# Patient Record
Sex: MALE | Race: WHITE | NOT HISPANIC OR LATINO | Employment: FULL TIME | URBAN - METROPOLITAN AREA
[De-identification: names, ages, dates, MRNs, and addresses within clinical notes are randomized per-mention and may not be internally consistent; named-entity substitution may affect disease eponyms.]

---

## 2017-01-05 ENCOUNTER — ALLSCRIPTS OFFICE VISIT (OUTPATIENT)
Dept: OTHER | Facility: OTHER | Age: 62
End: 2017-01-05

## 2017-04-25 ENCOUNTER — ALLSCRIPTS OFFICE VISIT (OUTPATIENT)
Dept: OTHER | Facility: OTHER | Age: 62
End: 2017-04-25

## 2017-04-25 DIAGNOSIS — Z13.6 ENCOUNTER FOR SCREENING FOR CARDIOVASCULAR DISORDERS: ICD-10-CM

## 2017-04-25 DIAGNOSIS — Z12.5 ENCOUNTER FOR SCREENING FOR MALIGNANT NEOPLASM OF PROSTATE: ICD-10-CM

## 2017-05-04 ENCOUNTER — ALLSCRIPTS OFFICE VISIT (OUTPATIENT)
Dept: OTHER | Facility: OTHER | Age: 62
End: 2017-05-04

## 2017-05-06 ENCOUNTER — LAB CONVERSION - ENCOUNTER (OUTPATIENT)
Dept: OTHER | Facility: OTHER | Age: 62
End: 2017-05-06

## 2017-05-06 LAB
A/G RATIO (HISTORICAL): 1.5 (CALC) (ref 1–2.5)
ALBUMIN SERPL BCP-MCNC: 4 G/DL (ref 3.6–5.1)
ALP SERPL-CCNC: 107 U/L (ref 40–115)
ALT SERPL W P-5'-P-CCNC: 18 U/L (ref 9–46)
AST SERPL W P-5'-P-CCNC: 19 U/L (ref 10–35)
BILIRUB SERPL-MCNC: 0.6 MG/DL (ref 0.2–1.2)
BUN SERPL-MCNC: 14 MG/DL (ref 7–25)
BUN/CREA RATIO (HISTORICAL): NORMAL (CALC) (ref 6–22)
CALCIUM SERPL-MCNC: 8.9 MG/DL (ref 8.6–10.3)
CHLORIDE SERPL-SCNC: 104 MMOL/L (ref 98–110)
CHOLEST SERPL-MCNC: 193 MG/DL (ref 125–200)
CHOLEST/HDLC SERPL: 6.4 (CALC)
CO2 SERPL-SCNC: 26 MMOL/L (ref 20–31)
CREAT SERPL-MCNC: 1.14 MG/DL (ref 0.7–1.25)
EGFR AFRICAN AMERICAN (HISTORICAL): 79 ML/MIN/1.73M2
EGFR-AMERICAN CALC (HISTORICAL): 69 ML/MIN/1.73M2
GAMMA GLOBULIN (HISTORICAL): 2.7 G/DL (CALC) (ref 1.9–3.7)
GLUCOSE (HISTORICAL): 88 MG/DL (ref 65–99)
HDLC SERPL-MCNC: 30 MG/DL
LDL CHOLESTEROL (HISTORICAL): 139 MG/DL (CALC)
NON-HDL-CHOL (CHOL-HDL) (HISTORICAL): 163 MG/DL (CALC)
POTASSIUM SERPL-SCNC: 4.6 MMOL/L (ref 3.5–5.3)
PROSTATE SPECIFIC ANTIGEN TOTAL (HISTORICAL): 0.8 NG/ML
SODIUM SERPL-SCNC: 138 MMOL/L (ref 135–146)
TOTAL PROTEIN (HISTORICAL): 6.7 G/DL (ref 6.1–8.1)
TRIGL SERPL-MCNC: 118 MG/DL

## 2017-05-07 ENCOUNTER — GENERIC CONVERSION - ENCOUNTER (OUTPATIENT)
Dept: OTHER | Facility: OTHER | Age: 62
End: 2017-05-07

## 2017-05-14 ENCOUNTER — ANESTHESIA EVENT (OUTPATIENT)
Dept: PERIOP | Facility: HOSPITAL | Age: 62
End: 2017-05-14
Payer: COMMERCIAL

## 2017-05-15 ENCOUNTER — ANESTHESIA (OUTPATIENT)
Dept: PERIOP | Facility: HOSPITAL | Age: 62
End: 2017-05-15
Payer: COMMERCIAL

## 2017-05-15 ENCOUNTER — GENERIC CONVERSION - ENCOUNTER (OUTPATIENT)
Dept: OTHER | Facility: OTHER | Age: 62
End: 2017-05-15

## 2017-05-15 ENCOUNTER — HOSPITAL ENCOUNTER (OUTPATIENT)
Facility: HOSPITAL | Age: 62
Setting detail: OUTPATIENT SURGERY
Discharge: HOME | End: 2017-05-15
Attending: INTERNAL MEDICINE | Admitting: INTERNAL MEDICINE
Payer: COMMERCIAL

## 2017-05-15 VITALS
RESPIRATION RATE: 20 BRPM | HEART RATE: 72 BPM | HEIGHT: 68 IN | OXYGEN SATURATION: 97 % | TEMPERATURE: 97.1 F | WEIGHT: 190 LBS | DIASTOLIC BLOOD PRESSURE: 64 MMHG | BODY MASS INDEX: 28.79 KG/M2 | SYSTOLIC BLOOD PRESSURE: 104 MMHG

## 2017-05-15 RX ORDER — SODIUM CHLORIDE, SODIUM LACTATE, POTASSIUM CHLORIDE, CALCIUM CHLORIDE 600; 310; 30; 20 MG/100ML; MG/100ML; MG/100ML; MG/100ML
125 INJECTION, SOLUTION INTRAVENOUS CONTINUOUS
Status: DISCONTINUED | OUTPATIENT
Start: 2017-05-15 | End: 2017-05-15 | Stop reason: HOSPADM

## 2017-05-15 RX ORDER — PROPOFOL 10 MG/ML
INJECTION, EMULSION INTRAVENOUS AS NEEDED
Status: DISCONTINUED | OUTPATIENT
Start: 2017-05-15 | End: 2017-05-15 | Stop reason: SURG

## 2017-05-15 RX ORDER — MAGNESIUM 30 MG
30 TABLET ORAL 2 TIMES DAILY
COMMUNITY
End: 2018-06-19

## 2017-05-15 RX ADMIN — PROPOFOL 20 MG: 10 INJECTION, EMULSION INTRAVENOUS at 10:12

## 2017-05-15 RX ADMIN — SODIUM CHLORIDE, SODIUM LACTATE, POTASSIUM CHLORIDE, AND CALCIUM CHLORIDE 125 ML/HR: .6; .31; .03; .02 INJECTION, SOLUTION INTRAVENOUS at 09:29

## 2017-05-15 RX ADMIN — PROPOFOL 30 MG: 10 INJECTION, EMULSION INTRAVENOUS at 10:15

## 2017-05-15 RX ADMIN — PROPOFOL 20 MG: 10 INJECTION, EMULSION INTRAVENOUS at 10:18

## 2017-05-15 RX ADMIN — PROPOFOL 130 MG: 10 INJECTION, EMULSION INTRAVENOUS at 10:09

## 2017-12-11 ENCOUNTER — GENERIC CONVERSION - ENCOUNTER (OUTPATIENT)
Dept: FAMILY MEDICINE CLINIC | Facility: CLINIC | Age: 62
End: 2017-12-11

## 2018-01-12 NOTE — RESULT NOTES
Verified Results  (1) LIPID PANEL, FASTING [id number] 07:15AM Shaepattyradha [place]agland[place]     Test Name Result Flag Reference   CHOLESTEROL, TOTAL 193 mg/dL  125-200   HDL CHOLESTEROL 30 mg/dL L > OR = 40   TRIGLICERIDES 400 mg/dL  <150   LDL-CHOLESTEROL 139 mg/dL (calc) H <130   Desirable range <100 mg/dL for patients with CHD or  diabetes and <70 mg/dL for diabetic patients with  known heart disease  CHOL/HDLC RATIO 6 4 (calc) H < OR = 5 0   NON HDL CHOLESTEROL 163 mg/dL (calc) H    Target for non-HDL cholesterol is 30 mg/dL higher than   LDL cholesterol target  (1) COMPREHENSIVE METABOLIC PANEL [id number] 61:78UY Shaepattyradha [place]     Test Name Result Flag Reference   GLUCOSE 88 mg/dL  65-99   Fasting reference interval   UREA NITROGEN (BUN) 14 mg/dL  7-25   CREATININE 1 14 mg/dL  0 70-1 25   For patients >52years of age, the reference limit  for Creatinine is approximately 13% higher for people  identified as -American  eGFR NON-AFR  AMERICAN 69 mL/min/1 73m2  > OR = 60   eGFR AFRICAN AMERICAN 79 mL/min/1 73m2  > OR = 60   BUN/CREATININE RATIO   4-27   NOT APPLICABLE (calc)   SODIUM 138 mmol/L  135-146   POTASSIUM 4 6 mmol/L  3 5-5 3   CHLORIDE 104 mmol/L     CARBON DIOXIDE 26 mmol/L  20-31   CALCIUM 8 9 mg/dL  8 6-10 3   PROTEIN, TOTAL 6 7 g/dL  6 1-8 1   ALBUMIN 4 0 g/dL  3 6-5 1   GLOBULIN 2 7 g/dL (calc)  1 9-3 7   ALBUMIN/GLOBULIN RATIO 1 5 (calc)  1 0-2 5   BILIRUBIN, TOTAL 0 6 mg/dL  0 2-1 2   ALKALINE PHOSPHATASE 107 U/L     AST 19 U/L  10-35   ALT 18 U/L  9-46     (1) PSA (SCREEN) (Dx V76 44 Screen for Prostate Cancer) [id number] 07:15AM Jerson Guillermoet   REPORT COMMENT:  FASTING:YES     Test Name Result Flag Reference   PSA, TOTAL 0 8 ng/mL  < OR = 4 0   This test was performed using the Siemens  chemiluminescent method  Values obtained from  different assay methods cannot be used  interchangeably   PSA levels, regardless of  value, should not be interpreted as absolute  evidence of the presence or absence of disease

## 2018-01-13 VITALS
HEIGHT: 68 IN | HEART RATE: 76 BPM | WEIGHT: 195.38 LBS | BODY MASS INDEX: 29.61 KG/M2 | DIASTOLIC BLOOD PRESSURE: 80 MMHG | SYSTOLIC BLOOD PRESSURE: 128 MMHG

## 2018-01-14 VITALS
HEIGHT: 68 IN | HEART RATE: 76 BPM | SYSTOLIC BLOOD PRESSURE: 124 MMHG | WEIGHT: 195 LBS | DIASTOLIC BLOOD PRESSURE: 78 MMHG | BODY MASS INDEX: 29.55 KG/M2

## 2018-01-18 NOTE — PROGRESS NOTES
Assessment   1  Encounter for preventive health examination (V70 0) (Z00 00)    Plan  Health Maintenance    · Follow-up visit in 1 year Evaluation and Treatment  Follow-up  Status: Hold For -  Scheduling  Requested for: 25Apr2017  Prostate cancer screening, Screening for cardiovascular condition    · (1) COMPREHENSIVE METABOLIC PANEL; Status:Active; Requested for:25Apr2017;    · (1) LIPID PANEL, FASTING; Status:Active; Requested for:25Apr2017;    · (1) PSA (SCREEN) (Dx V76 44 Screen for Prostate Cancer); Status:Active; Requested  for:25Apr2017;     Discussion/Summary  Impression: health maintenance visit  Prostate cancer screening: PSA was ordered  Testicular cancer screening: the risks and benefits of testicular cancer screening were discussed  Colorectal cancer screening: colonoscopy is needed every ten years  Screening lab work includes glucose and lipid profile  Advice and education were given regarding weight bearing exercise and cardiovascular risk reduction  Chief Complaint  Patient is here for a yearly check up      History of Present Illness  HM, Adult Male: The patient is being seen for a health maintenance evaluation  General Health: The patient's health since the last visit is described as good  He has regular dental visits  He denies vision problems  He denies hearing loss  Immunizations status: up to date  Lifestyle:  He consumes a diverse and healthy diet  He does not have any weight concerns  He exercises regularly  He does not use tobacco  He denies alcohol use  He denies drug use  Screening: cancer screening reviewed and current  metabolic screening reviewed and current  risk screening reviewed and current  Review of Systems    Constitutional: No fever or chills, feels well, no tiredness, no recent weight gain or weight loss  Eyes: No complaints of eye pain, no red eyes, no discharge from eyes, no itchy eyes     ENT: no complaints of earache, no hearing loss, no nosebleeds, no nasal discharge, no sore throat, no hoarseness  Cardiovascular: No complaints of slow heart rate, no fast heart rate, no chest pain, no palpitations, no leg claudication, no lower extremity  Respiratory: No complaints of shortness of breath, no wheezing, no cough, no SOB on exertion, no orthopnea or PND  Gastrointestinal: No complaints of abdominal pain, no constipation, no nausea or vomiting, no diarrhea or bloody stools  Genitourinary: No complaints of dysuria, no incontinence, no hesitancy, no nocturia, no genital lesion, no testicular pain  Musculoskeletal: No complaints of arthralgia, no myalgias, no joint swelling or stiffness, no limb pain or swelling  Integumentary: No complaints of skin rash or skin lesions, no itching, no skin wound, no dry skin  Neurological: No compliants of headache, no confusion, no convulsions, no numbness or tingling, no dizziness or fainting, no limb weakness, no difficulty walking  Psychiatric: Is not suicidal, no sleep disturbances, no anxiety or depression, no change in personality, no emotional problems  Endocrine: No complaints of proptosis, no hot flashes, no muscle weakness, no erectile dysfunction, no deepening of the voice, no feelings of weakness  Hematologic/Lymphatic: No complaints of swollen glands, no swollen glands in the neck, does not bleed easily, no easy bruising  Active Problems   1  Colon cancer screening (V76 51) (Z12 11)  2  Constipation (564 00) (K59 00)  3  Cutaneous candidiasis (112 3) (B37 2)  4  Encounter for screening colonoscopy (V76 51) (Z12 11)  5  External hemorrhoid (455 3) (K64 4)  6  Prostate cancer screening (V76 44) (Z12 5)  7  Screening for diabetes mellitus (V77 1) (Z13 1)  8  Screening for hyperlipidemia (V77 91) (Z13 220)  9  Tinea of nail (110 1) (B35 1)  10  Varicose veins of legs (454 9) (I83 93)  11   Viral illness (079 99) (B34 9)    Surgical History    · History of Amputation Of Index Finger, With Neurectomy (Each)   · History of Amputation Of Middle Finger, With Neurectomy (Each)   · History of Post Spinal Diskect Osteophytect Lumb Interspace Microdiscec    Family History  Father    · Family history of Cancer (199 1) (C80 1)   · Family history of Heart disease (429 9) (I51 9)   · Family history of Hypertension (401 9) (I10)   · Family history of Prostate cancer (80) (C61)   · Family history of Stroke (434 91) (I63 9)   · Family history of Thyroid trouble (246 9) (E07 9)  Daughter    · Family history of Asthma (493 90) (J45 909)  Paternal Uncle    · Family history of Prostate cancer (80) (C61)    Social History    · Always uses seat belt   · Consumes alcohol occasionally   · Current every day smoker   · Full-time employment   ·    · No drug use    Current Meds  1  Clotrimazole-Betamethasone 1-0 05 % External Cream; APPLY  AND RUB  IN A THIN   FILM TO AFFECTED AREAS TWICE DAILY  (AM AND PM); Therapy: 12Apr2016 to (Last Rx:05Jan2017)  Requested for: [id number] Ordered  2  Hydrocortisone 2 5 % External Cream; APPLY 2-3 TIMES DAILY TO AFFECTED   AREA(S); Therapy: [id number] to (Evaluate:12Apr2017)  Requested for: 05Apr2017; Last   Rx:05Apr2017 Ordered  3  Saw Palmetto 450 MG Oral Capsule; TAKE AS DIRECTED; Therapy: [id number] to (Last Rx:05Jan2017) Ordered    Allergies   1  No Known Drug Allergies    Vitals   Recorded: 25Apr2017 05:48PM   Heart Rate 77   Systolic 844   Diastolic 62   Height 5 ft 8 in   Weight 195 lb 4 96 oz   BMI Calculated 29 7   BSA Calculated 2 03   O2 Saturation 98     Physical Exam    Constitutional   General appearance: No acute distress, well appearing and well nourished  Eyes   Conjunctiva and lids: No erythema, swelling or discharge  Pupils and irises: Equal, round, reactive to light  Ophthalmoscopic examination: Normal fundi and optic discs      Ears, Nose, Mouth, and Throat   External inspection of ears and nose: Normal     Otoscopic examination: Tympanic membranes translucent with normal light reflex  Canals patent without erythema  Hearing: Normal     Nasal mucosa, septum, and turbinates: Normal without edema or erythema  Lips, teeth, and gums: Normal, good dentition  Oropharynx: Normal with no erythema, edema, exudate or lesions  Neck   Neck: Supple, symmetric, trachea midline, no masses  Thyroid: Normal, no thyromegaly  Pulmonary   Respiratory effort: No increased work of breathing or signs of respiratory distress  Percussion of chest: Normal     Palpation of chest: Normal     Auscultation of lungs: Clear to auscultation  Cardiovascular   Palpation of heart: Normal PMI, no thrills  Auscultation of heart: Normal rate and rhythm, normal S1 and S2, no murmurs  Carotid pulses: 2+ bilaterally  Abdominal aorta: Normal     Femoral pulses: 2+ bilaterally  Pedal pulses: 2+ bilaterally  Examination of extremities for edema and/or varicosities: Normal     Chest   Breasts: Normal, no dimpling or skin changes appreciated  Palpation of breasts and axillae: Normal, no masses palpated  Chest: Normal     Abdomen   Abdomen: Non-tender, no masses  Liver and spleen: No hepatomegaly or splenomegaly  Examination for hernias: No hernias appreciated  Lymphatic   Palpation of lymph nodes in neck: No lymphadenopathy  Palpation of lymph nodes in axillae: No lymphadenopathy  Musculoskeletal   Gait and station: Normal     Inspection/palpation of digits and nails: Normal without clubbing or cyanosis  Inspection/palpation of joints, bones, and muscles: Normal     Range of motion: Normal     Stability: Normal     Muscle strength/tone: Normal     Skin   Skin and subcutaneous tissue: Normal without rashes or lesions  Palpation of skin and subcutaneous tissue: Normal turgor  Neurologic   Cranial nerves: Cranial nerves 2-12 intact  Reflexes: 2+ and symmetric  Sensation: No sensory loss      Psychiatric   Judgment and insight: Normal  Orientation to person, place and time: Normal     Recent and remote memory: Intact  Mood and affect: Normal        Future Appointments    Date/Time Provider Specialty Site   05/04/2017 04:15 PM Maritza Mcgovern MD [facility] Adult [facility] [address] [place] [address]   Electronically signed by : Dalia Hernandez DO;  Apr 26 2017 12:15PM EST                       (Author)

## 2018-01-22 VITALS
SYSTOLIC BLOOD PRESSURE: 130 MMHG | HEIGHT: 68 IN | OXYGEN SATURATION: 98 % | DIASTOLIC BLOOD PRESSURE: 62 MMHG | WEIGHT: 195.31 LBS | HEART RATE: 77 BPM | BODY MASS INDEX: 29.6 KG/M2

## 2018-06-18 RX ORDER — NYSTATIN 100000 [USP'U]/G
POWDER TOPICAL
COMMUNITY
Start: 2017-05-15 | End: 2018-06-19

## 2018-06-18 RX ORDER — CLOTRIMAZOLE AND BETAMETHASONE DIPROPIONATE 10; .64 MG/G; MG/G
CREAM TOPICAL 2 TIMES DAILY
COMMUNITY
Start: 2016-04-12 | End: 2018-06-19

## 2018-06-19 ENCOUNTER — OFFICE VISIT (OUTPATIENT)
Dept: FAMILY MEDICINE CLINIC | Facility: CLINIC | Age: 63
End: 2018-06-19
Payer: COMMERCIAL

## 2018-06-19 VITALS
TEMPERATURE: 98.4 F | WEIGHT: 187 LBS | DIASTOLIC BLOOD PRESSURE: 78 MMHG | HEIGHT: 68 IN | BODY MASS INDEX: 28.34 KG/M2 | SYSTOLIC BLOOD PRESSURE: 110 MMHG | OXYGEN SATURATION: 96 % | HEART RATE: 82 BPM

## 2018-06-19 DIAGNOSIS — Z00.00 ENCOUNTER FOR PREVENTIVE HEALTH EXAMINATION: ICD-10-CM

## 2018-06-19 DIAGNOSIS — I83.93 VARICOSE VEINS OF LEGS: Primary | ICD-10-CM

## 2018-06-19 DIAGNOSIS — E78.5 DYSLIPIDEMIA: ICD-10-CM

## 2018-06-19 DIAGNOSIS — R39.11 URINARY HESITANCY: ICD-10-CM

## 2018-06-19 DIAGNOSIS — Z12.5 PROSTATE CANCER SCREENING: ICD-10-CM

## 2018-06-19 PROCEDURE — 99396 PREV VISIT EST AGE 40-64: CPT | Performed by: FAMILY MEDICINE

## 2018-06-19 NOTE — PROGRESS NOTES
Assessment/Plan:    Return for annual physical   Colonoscopy is up-to-date        Problem List Items Addressed This Visit     Encounter for preventive health examination    Dyslipidemia    Relevant Orders    CBC and differential    Comprehensive metabolic panel    Lipid panel    Varicose veins of legs - Primary    Relevant Orders    Ambulatory referral to Vascular Surgery    Urinary hesitancy     Continue saw palmetto           Other Visit Diagnoses     Prostate cancer screening        Relevant Orders    PSA, Total Screen            Subjective:      Patient ID: Sumanth Dueñas is a 61 y o  male  Patient comes in for annual checkup  He complains of varicose vein left leg that has been there for several years but now is becoming painful  The following portions of the patient's history were reviewed and updated as appropriate: allergies, current medications, past family history, past medical history, past social history, past surgical history and problem list     Review of Systems   Constitutional: Negative  HENT: Negative  Respiratory: Negative  Cardiovascular: Negative  Objective:      /78   Pulse 82   Temp 98 4 °F (36 9 °C)   Ht 5' 8" (1 727 m)   Wt 84 8 kg (187 lb)   SpO2 96%   BMI 28 43 kg/m²          Physical Exam   Constitutional: He is oriented to person, place, and time  He appears well-developed and well-nourished  HENT:   Head: Normocephalic and atraumatic  Right Ear: Tympanic membrane and external ear normal    Left Ear: Tympanic membrane and external ear normal    Mouth/Throat: Oropharynx is clear and moist    Eyes: EOM are normal  Pupils are equal, round, and reactive to light  Neck: Neck supple  Cardiovascular: Normal rate, regular rhythm and normal heart sounds  Pulmonary/Chest: Effort normal and breath sounds normal    Abdominal: Soft  Bowel sounds are normal    Musculoskeletal: Normal range of motion     Neurological: He is alert and oriented to person, place, and time  Skin: Skin is warm and dry  Large varicose vein medial left thigh   Psychiatric: He has a normal mood and affect   Thought content normal

## 2018-08-11 LAB
ALBUMIN SERPL-MCNC: 4.1 G/DL (ref 3.6–5.1)
ALBUMIN/GLOB SERPL: 1.6 (CALC) (ref 1–2.5)
ALP SERPL-CCNC: 109 U/L (ref 40–115)
ALT SERPL-CCNC: 21 U/L (ref 9–46)
AST SERPL-CCNC: 20 U/L (ref 10–35)
BASOPHILS # BLD AUTO: 37 CELLS/UL (ref 0–200)
BASOPHILS NFR BLD AUTO: 0.4 %
BILIRUB SERPL-MCNC: 0.7 MG/DL (ref 0.2–1.2)
BUN SERPL-MCNC: 21 MG/DL (ref 7–25)
BUN/CREAT SERPL: NORMAL (CALC) (ref 6–22)
CALCIUM SERPL-MCNC: 9.3 MG/DL (ref 8.6–10.3)
CHLORIDE SERPL-SCNC: 104 MMOL/L (ref 98–110)
CHOLEST SERPL-MCNC: 196 MG/DL
CHOLEST/HDLC SERPL: 6.1 (CALC)
CO2 SERPL-SCNC: 28 MMOL/L (ref 20–32)
CREAT SERPL-MCNC: 1.06 MG/DL (ref 0.7–1.25)
EOSINOPHIL # BLD AUTO: 102 CELLS/UL (ref 15–500)
EOSINOPHIL NFR BLD AUTO: 1.1 %
ERYTHROCYTE [DISTWIDTH] IN BLOOD BY AUTOMATED COUNT: 13.5 % (ref 11–15)
GLOBULIN SER CALC-MCNC: 2.6 G/DL (CALC) (ref 1.9–3.7)
GLUCOSE SERPL-MCNC: 96 MG/DL (ref 65–99)
HCT VFR BLD AUTO: 47.9 % (ref 38.5–50)
HDLC SERPL-MCNC: 32 MG/DL
HGB BLD-MCNC: 16.5 G/DL (ref 13.2–17.1)
LDLC SERPL CALC-MCNC: 139 MG/DL (CALC)
LYMPHOCYTES # BLD AUTO: 2325 CELLS/UL (ref 850–3900)
LYMPHOCYTES NFR BLD AUTO: 25 %
MCH RBC QN AUTO: 31.4 PG (ref 27–33)
MCHC RBC AUTO-ENTMCNC: 34.4 G/DL (ref 32–36)
MCV RBC AUTO: 91.1 FL (ref 80–100)
MONOCYTES # BLD AUTO: 1200 CELLS/UL (ref 200–950)
MONOCYTES NFR BLD AUTO: 12.9 %
NEUTROPHILS # BLD AUTO: 5636 CELLS/UL (ref 1500–7800)
NEUTROPHILS NFR BLD AUTO: 60.6 %
NONHDLC SERPL-MCNC: 164 MG/DL (CALC)
PLATELET # BLD AUTO: 179 THOUSAND/UL (ref 140–400)
PMV BLD REES-ECKER: 10.7 FL (ref 7.5–12.5)
POTASSIUM SERPL-SCNC: 4.3 MMOL/L (ref 3.5–5.3)
PROT SERPL-MCNC: 6.7 G/DL (ref 6.1–8.1)
PSA SERPL-MCNC: 0.7 NG/ML
RBC # BLD AUTO: 5.26 MILLION/UL (ref 4.2–5.8)
SL AMB EGFR AFRICAN AMERICAN: 86 ML/MIN/1.73M2
SL AMB EGFR NON AFRICAN AMERICAN: 74 ML/MIN/1.73M2
SODIUM SERPL-SCNC: 138 MMOL/L (ref 135–146)
TRIGL SERPL-MCNC: 128 MG/DL
WBC # BLD AUTO: 9.3 THOUSAND/UL (ref 3.8–10.8)

## 2018-08-29 ENCOUNTER — TELEPHONE (OUTPATIENT)
Dept: FAMILY MEDICINE CLINIC | Facility: CLINIC | Age: 63
End: 2018-08-29

## 2018-08-29 DIAGNOSIS — E78.5 HYPERLIPIDEMIA, UNSPECIFIED HYPERLIPIDEMIA TYPE: ICD-10-CM

## 2018-08-29 DIAGNOSIS — Z12.5 PROSTATE CANCER SCREENING: ICD-10-CM

## 2018-08-29 DIAGNOSIS — D64.9 ANEMIA, UNSPECIFIED TYPE: Primary | ICD-10-CM

## 2018-08-29 NOTE — TELEPHONE ENCOUNTER
Pt wife called and ask if you can change the DX-code her her recent labs her insurance is not paying for the lab work  Once finish please fax to  [phone]

## 2019-01-15 ENCOUNTER — OFFICE VISIT (OUTPATIENT)
Dept: FAMILY MEDICINE CLINIC | Facility: CLINIC | Age: 64
End: 2019-01-15
Payer: COMMERCIAL

## 2019-01-15 VITALS
RESPIRATION RATE: 16 BRPM | BODY MASS INDEX: 29.55 KG/M2 | TEMPERATURE: 98.4 F | HEIGHT: 68 IN | OXYGEN SATURATION: 98 % | SYSTOLIC BLOOD PRESSURE: 126 MMHG | HEART RATE: 78 BPM | DIASTOLIC BLOOD PRESSURE: 80 MMHG | WEIGHT: 195 LBS

## 2019-01-15 DIAGNOSIS — M54.2 CERVICALGIA: Primary | ICD-10-CM

## 2019-01-15 PROCEDURE — 99213 OFFICE O/P EST LOW 20 MIN: CPT | Performed by: FAMILY MEDICINE

## 2019-01-15 PROCEDURE — 3008F BODY MASS INDEX DOCD: CPT | Performed by: FAMILY MEDICINE

## 2019-01-15 RX ORDER — CYCLOBENZAPRINE HCL 10 MG
10 TABLET ORAL
Qty: 30 TABLET | Refills: 0 | Status: SHIPPED | OUTPATIENT
Start: 2019-01-15 | End: 2019-05-08

## 2019-01-15 RX ORDER — PREDNISONE 10 MG/1
TABLET ORAL
Qty: 30 TABLET | Refills: 0 | Status: SHIPPED | OUTPATIENT
Start: 2019-01-15 | End: 2019-05-08

## 2019-01-15 NOTE — PROGRESS NOTES
Assessment/Plan:           Problem List Items Addressed This Visit     Cervicalgia - Primary    Relevant Medications    predniSONE 10 mg tablet    cyclobenzaprine (FLEXERIL) 10 mg tablet            Subjective:      Patient ID: Patrick Morales is a 61 y o  male  Patient comes in with a 12 day history of left neck pain  This began after he had dental work done on the left side of his jaw  He denies any history of neck problems in the past         The following portions of the patient's history were reviewed and updated as appropriate: allergies, current medications, past family history, past medical history, past social history, past surgical history and problem list     Review of Systems   Constitutional: Negative  HENT: Negative  Respiratory: Negative  Cardiovascular: Negative  Objective:      /80   Pulse 78   Temp 98 4 °F (36 9 °C)   Resp 16   Ht 5' 8" (1 727 m)   Wt 88 5 kg (195 lb)   SpO2 98%   BMI 29 65 kg/m²          Physical Exam   Constitutional: He appears well-developed and well-nourished  HENT:   Head: Normocephalic and atraumatic  Right Ear: External ear normal    Left Ear: External ear normal    Mouth/Throat: Oropharynx is clear and moist    Eyes: Pupils are equal, round, and reactive to light  EOM are normal    Musculoskeletal:   Tender left cervical paraspinal muscles pain with rotation of head to the right and left  Lymphadenopathy:     He has no cervical adenopathy

## 2019-05-08 ENCOUNTER — OFFICE VISIT (OUTPATIENT)
Dept: FAMILY MEDICINE CLINIC | Facility: CLINIC | Age: 64
End: 2019-05-08
Payer: COMMERCIAL

## 2019-05-08 VITALS
HEIGHT: 68 IN | SYSTOLIC BLOOD PRESSURE: 126 MMHG | WEIGHT: 194 LBS | HEART RATE: 78 BPM | RESPIRATION RATE: 16 BRPM | TEMPERATURE: 97.8 F | DIASTOLIC BLOOD PRESSURE: 72 MMHG | BODY MASS INDEX: 29.4 KG/M2 | OXYGEN SATURATION: 98 %

## 2019-05-08 DIAGNOSIS — R68.89 FLU-LIKE SYMPTOMS: Primary | ICD-10-CM

## 2019-05-08 PROCEDURE — 99213 OFFICE O/P EST LOW 20 MIN: CPT | Performed by: FAMILY MEDICINE

## 2019-05-08 PROCEDURE — 87631 RESP VIRUS 3-5 TARGETS: CPT | Performed by: FAMILY MEDICINE

## 2019-05-08 PROCEDURE — 3008F BODY MASS INDEX DOCD: CPT | Performed by: FAMILY MEDICINE

## 2019-05-08 RX ORDER — OSELTAMIVIR PHOSPHATE 75 MG/1
75 CAPSULE ORAL EVERY 12 HOURS SCHEDULED
Qty: 10 CAPSULE | Refills: 0 | Status: SHIPPED | OUTPATIENT
Start: 2019-05-08 | End: 2019-05-14

## 2019-05-08 RX ORDER — PROMETHAZINE HYDROCHLORIDE AND CODEINE PHOSPHATE 6.25; 1 MG/5ML; MG/5ML
5 SYRUP ORAL EVERY 4 HOURS PRN
Qty: 240 ML | Refills: 0 | Status: SHIPPED | OUTPATIENT
Start: 2019-05-08 | End: 2019-05-14

## 2019-05-09 LAB
FLUAV AG SPEC QL: NOT DETECTED
FLUBV AG SPEC QL: NOT DETECTED
RSV B RNA SPEC QL NAA+PROBE: NOT DETECTED

## 2019-05-14 ENCOUNTER — OFFICE VISIT (OUTPATIENT)
Dept: FAMILY MEDICINE CLINIC | Facility: CLINIC | Age: 64
End: 2019-05-14
Payer: COMMERCIAL

## 2019-05-14 VITALS
RESPIRATION RATE: 16 BRPM | HEIGHT: 68 IN | BODY MASS INDEX: 29.1 KG/M2 | HEART RATE: 88 BPM | SYSTOLIC BLOOD PRESSURE: 112 MMHG | TEMPERATURE: 96.8 F | DIASTOLIC BLOOD PRESSURE: 64 MMHG | WEIGHT: 192 LBS | OXYGEN SATURATION: 97 %

## 2019-05-14 DIAGNOSIS — R39.11 URINARY HESITANCY: ICD-10-CM

## 2019-05-14 DIAGNOSIS — Z00.00 ENCOUNTER FOR PREVENTIVE HEALTH EXAMINATION: Primary | ICD-10-CM

## 2019-05-14 DIAGNOSIS — Z12.5 PROSTATE CANCER SCREENING: ICD-10-CM

## 2019-05-14 DIAGNOSIS — E78.5 DYSLIPIDEMIA: ICD-10-CM

## 2019-05-14 PROCEDURE — 99396 PREV VISIT EST AGE 40-64: CPT | Performed by: FAMILY MEDICINE

## 2019-05-14 RX ORDER — TAMSULOSIN HYDROCHLORIDE 0.4 MG/1
0.4 CAPSULE ORAL
Qty: 30 CAPSULE | Refills: 5 | Status: SHIPPED | OUTPATIENT
Start: 2019-05-14 | End: 2019-12-06 | Stop reason: SDUPTHER

## 2019-09-07 LAB
ALBUMIN SERPL-MCNC: 3.9 G/DL (ref 3.6–5.1)
ALBUMIN/GLOB SERPL: 1.8 (CALC) (ref 1–2.5)
ALP SERPL-CCNC: 94 U/L (ref 40–115)
ALT SERPL-CCNC: 17 U/L (ref 9–46)
AST SERPL-CCNC: 18 U/L (ref 10–35)
BASOPHILS # BLD AUTO: 51 CELLS/UL (ref 0–200)
BASOPHILS NFR BLD AUTO: 0.6 %
BILIRUB SERPL-MCNC: 0.5 MG/DL (ref 0.2–1.2)
BUN SERPL-MCNC: 15 MG/DL (ref 7–25)
BUN/CREAT SERPL: NORMAL (CALC) (ref 6–22)
CALCIUM SERPL-MCNC: 9 MG/DL (ref 8.6–10.3)
CHLORIDE SERPL-SCNC: 107 MMOL/L (ref 98–110)
CHOLEST SERPL-MCNC: 185 MG/DL
CHOLEST/HDLC SERPL: 5.3 (CALC)
CO2 SERPL-SCNC: 27 MMOL/L (ref 20–32)
CREAT SERPL-MCNC: 1.19 MG/DL (ref 0.7–1.25)
EOSINOPHIL # BLD AUTO: 162 CELLS/UL (ref 15–500)
EOSINOPHIL NFR BLD AUTO: 1.9 %
ERYTHROCYTE [DISTWIDTH] IN BLOOD BY AUTOMATED COUNT: 13.6 % (ref 11–15)
GLOBULIN SER CALC-MCNC: 2.2 G/DL (CALC) (ref 1.9–3.7)
GLUCOSE SERPL-MCNC: 94 MG/DL (ref 65–99)
HCT VFR BLD AUTO: 46 % (ref 38.5–50)
HDLC SERPL-MCNC: 35 MG/DL
HGB BLD-MCNC: 15.6 G/DL (ref 13.2–17.1)
LDLC SERPL CALC-MCNC: 130 MG/DL (CALC)
LYMPHOCYTES # BLD AUTO: 1964 CELLS/UL (ref 850–3900)
LYMPHOCYTES NFR BLD AUTO: 23.1 %
MCH RBC QN AUTO: 30.6 PG (ref 27–33)
MCHC RBC AUTO-ENTMCNC: 33.9 G/DL (ref 32–36)
MCV RBC AUTO: 90.4 FL (ref 80–100)
MONOCYTES # BLD AUTO: 1054 CELLS/UL (ref 200–950)
MONOCYTES NFR BLD AUTO: 12.4 %
NEUTROPHILS # BLD AUTO: 5270 CELLS/UL (ref 1500–7800)
NEUTROPHILS NFR BLD AUTO: 62 %
NONHDLC SERPL-MCNC: 150 MG/DL (CALC)
PLATELET # BLD AUTO: 161 THOUSAND/UL (ref 140–400)
PMV BLD REES-ECKER: 10.9 FL (ref 7.5–12.5)
POTASSIUM SERPL-SCNC: 4.4 MMOL/L (ref 3.5–5.3)
PROT SERPL-MCNC: 6.1 G/DL (ref 6.1–8.1)
RBC # BLD AUTO: 5.09 MILLION/UL (ref 4.2–5.8)
SL AMB EGFR AFRICAN AMERICAN: 74 ML/MIN/1.73M2
SL AMB EGFR NON AFRICAN AMERICAN: 64 ML/MIN/1.73M2
SODIUM SERPL-SCNC: 139 MMOL/L (ref 135–146)
TRIGL SERPL-MCNC: 96 MG/DL
WBC # BLD AUTO: 8.5 THOUSAND/UL (ref 3.8–10.8)

## 2019-11-11 ENCOUNTER — OFFICE VISIT (OUTPATIENT)
Dept: FAMILY MEDICINE CLINIC | Facility: CLINIC | Age: 64
End: 2019-11-11
Payer: COMMERCIAL

## 2019-11-11 VITALS
TEMPERATURE: 96.5 F | HEART RATE: 77 BPM | SYSTOLIC BLOOD PRESSURE: 106 MMHG | OXYGEN SATURATION: 97 % | BODY MASS INDEX: 29.95 KG/M2 | WEIGHT: 197 LBS | DIASTOLIC BLOOD PRESSURE: 72 MMHG

## 2019-11-11 DIAGNOSIS — K64.8 OTHER HEMORRHOIDS: ICD-10-CM

## 2019-11-11 DIAGNOSIS — L98.9 SKIN LESIONS: Primary | ICD-10-CM

## 2019-11-11 PROCEDURE — 99213 OFFICE O/P EST LOW 20 MIN: CPT | Performed by: PHYSICIAN ASSISTANT

## 2019-11-11 PROCEDURE — 87070 CULTURE OTHR SPECIMN AEROBIC: CPT | Performed by: PHYSICIAN ASSISTANT

## 2019-11-11 PROCEDURE — 87205 SMEAR GRAM STAIN: CPT | Performed by: PHYSICIAN ASSISTANT

## 2019-11-11 RX ORDER — TRIAMCINOLONE ACETONIDE 1 MG/G
CREAM TOPICAL 2 TIMES DAILY
Qty: 30 G | Refills: 0 | Status: SHIPPED | OUTPATIENT
Start: 2019-11-11 | End: 2020-09-10

## 2019-11-11 NOTE — PROGRESS NOTES
Assessment/Plan:          Diagnoses and all orders for this visit:    Skin lesions  -     mupirocin (BACTROBAN) 2 % ointment; Apply topically 3 (three) times a day    Other hemorrhoids  -     triamcinolone (KENALOG) 0 1 % cream; Apply topically 2 (two) times a day        Cultures done    Subjective:      Patient ID: Henrique Logan is a 59 y o  male  Patient has had 2 nonhealing skin lesions on his left lower leg laterally  He says they stay red and get very flaky and then that comes off and it starts all over again  He says they itch if you touch them are bother them  Says they do not hurt and they have not been draining at all  He has now developed another lesion on his right anterior shoulder  He has an area of from his venous surgery that is still healing on his left medial thigh  The following portions of the patient's history were reviewed and updated as appropriate:   He has no past medical history on file  ,  does not have any pertinent problems on file  ,   has a past surgical history that includes Finger amputation; pr colonoscopy flx dx w/collj spec when pfrmd (N/A, 5/15/2017); Spine surgery; and Varicose vein surgery (02/2019)  ,  family history includes Asthma in his daughter; Cancer in his father; Heart disease in his father; Hypertension in his father; Prostate cancer in his father and paternal uncle; Stroke in his father; Thyroid disease in his father  ,   reports that he has been smoking  He has been smoking about 0 20 packs per day  He has never used smokeless tobacco  He reports that he drinks alcohol  He reports that he does not use drugs  ,  has No Known Allergies     Current Outpatient Medications   Medication Sig Dispense Refill    tamsulosin (FLOMAX) 0 4 mg Take 1 capsule (0 4 mg total) by mouth daily with dinner 30 capsule 5    mupirocin (BACTROBAN) 2 % ointment Apply topically 3 (three) times a day 22 g 0    Saw Palmetto, Serenoa repens, 450 MG CAPS Take by mouth      triamcinolone (KENALOG) 0 1 % cream Apply topically 2 (two) times a day 30 g 0     No current facility-administered medications for this visit  Review of Systems   Constitutional: Negative for activity change, appetite change, fatigue and fever  HENT: Negative for trouble swallowing  Respiratory: Negative for shortness of breath  Cardiovascular: Negative for chest pain and leg swelling  Gastrointestinal: Negative for abdominal pain  Endocrine: Negative for cold intolerance and heat intolerance  Genitourinary: Negative for difficulty urinating  Skin: Positive for wound  Neurological: Negative for dizziness, light-headedness and headaches  Psychiatric/Behavioral: Negative for self-injury, sleep disturbance and suicidal ideas  Objective:  Vitals:    11/11/19 1646   BP: 106/72   Pulse: 77   Temp: (!) 96 5 °F (35 8 °C)   SpO2: 97%   Weight: 89 4 kg (197 lb)     Body mass index is 29 95 kg/m²  Physical Exam   Constitutional: He is oriented to person, place, and time  He appears well-developed and well-nourished  HENT:   Head: Normocephalic  Cardiovascular: Normal rate and regular rhythm  Pulmonary/Chest: Effort normal    Musculoskeletal: He exhibits no edema  Neurological: He is alert and oriented to person, place, and time  Skin: Skin is warm and dry  Areas of nonhealing lesions   Psychiatric: He has a normal mood and affect  His behavior is normal    Nursing note and vitals reviewed

## 2019-11-13 LAB
BACTERIA WND AEROBE CULT: NORMAL
GRAM STN SPEC: NORMAL

## 2019-11-14 DIAGNOSIS — L98.9 SKIN LESIONS: Primary | ICD-10-CM

## 2019-11-14 LAB
BACTERIA WND AEROBE CULT: ABNORMAL
GRAM STN SPEC: ABNORMAL

## 2019-11-14 RX ORDER — SULFAMETHOXAZOLE AND TRIMETHOPRIM 800; 160 MG/1; MG/1
1 TABLET ORAL EVERY 12 HOURS SCHEDULED
Qty: 20 TABLET | Refills: 0 | Status: SHIPPED | OUTPATIENT
Start: 2019-11-14 | End: 2019-11-24

## 2019-12-06 DIAGNOSIS — R39.11 URINARY HESITANCY: ICD-10-CM

## 2019-12-06 RX ORDER — TAMSULOSIN HYDROCHLORIDE 0.4 MG/1
0.4 CAPSULE ORAL
Qty: 30 CAPSULE | Refills: 5 | Status: SHIPPED | OUTPATIENT
Start: 2019-12-06 | End: 2020-01-22 | Stop reason: ALTCHOICE

## 2020-01-22 ENCOUNTER — OFFICE VISIT (OUTPATIENT)
Dept: FAMILY MEDICINE CLINIC | Facility: CLINIC | Age: 65
End: 2020-01-22
Payer: COMMERCIAL

## 2020-01-22 VITALS
DIASTOLIC BLOOD PRESSURE: 72 MMHG | HEIGHT: 68 IN | HEART RATE: 88 BPM | SYSTOLIC BLOOD PRESSURE: 120 MMHG | TEMPERATURE: 97.1 F | WEIGHT: 198 LBS | BODY MASS INDEX: 30.01 KG/M2 | OXYGEN SATURATION: 94 %

## 2020-01-22 DIAGNOSIS — L98.9 SKIN LESIONS: Primary | ICD-10-CM

## 2020-01-22 DIAGNOSIS — R39.11 URINARY HESITANCY: ICD-10-CM

## 2020-01-22 PROCEDURE — 99213 OFFICE O/P EST LOW 20 MIN: CPT | Performed by: PHYSICIAN ASSISTANT

## 2020-01-22 RX ORDER — CLOTRIMAZOLE AND BETAMETHASONE DIPROPIONATE 10; .64 MG/G; MG/G
CREAM TOPICAL 2 TIMES DAILY
Qty: 30 G | Refills: 0 | Status: SHIPPED | OUTPATIENT
Start: 2020-01-22

## 2020-01-22 RX ORDER — DOXYCYCLINE 100 MG/1
100 CAPSULE ORAL 2 TIMES DAILY
Qty: 20 CAPSULE | Refills: 0 | Status: SHIPPED | OUTPATIENT
Start: 2020-01-22 | End: 2020-02-01

## 2020-01-22 RX ORDER — TAMSULOSIN HYDROCHLORIDE 0.4 MG/1
0.8 CAPSULE ORAL
Qty: 180 CAPSULE | Refills: 1 | Status: SHIPPED | OUTPATIENT
Start: 2020-01-22 | End: 2020-06-22

## 2020-01-22 NOTE — PROGRESS NOTES
Assessment/Plan:  BMI Counseling: Body mass index is 30 11 kg/m²  The BMI is above normal  Nutrition recommendations include decreasing portion sizes, consuming healthier snacks, limiting drinks that contain sugar, moderation in carbohydrate intake, increasing intake of lean protein, reducing intake of saturated and trans fat and reducing intake of cholesterol  Exercise recommendations include exercising 3-5 times per week and strength training exercises  No pharmacotherapy was ordered  Diagnoses and all orders for this visit:    Skin lesions  -     doxycycline monohydrate (MONODOX) 100 mg capsule; Take 1 capsule (100 mg total) by mouth 2 (two) times a day for 10 days  -     clotrimazole-betamethasone (LOTRISONE) 1-0 05 % cream; Apply topically 2 (two) times a day    Urinary hesitancy  -     tamsulosin (FLOMAX) 0 4 mg; Take 2 capsules (0 8 mg total) by mouth daily with dinner            Subjective:      Patient ID: Vanita Osler is a 59 y o  male  Patient is here complaining that the 3 skin lesions that he has had for a while still are not healing completely  He states they will scab over and then the scab comes off and they will have some slight drainage and be raw and then scab over again  At times a become itchy and he will scratch the scab off  He has treated them with mupirocin cream and steroid cream       The following portions of the patient's history were reviewed and updated as appropriate:   He has no past medical history on file  ,  does not have any pertinent problems on file  ,   has a past surgical history that includes Finger amputation; pr colonoscopy flx dx w/collj spec when pfrmd (N/A, 5/15/2017); Spine surgery; and Varicose vein surgery (02/2019)  ,  family history includes Asthma in his daughter; Cancer in his father; Heart disease in his father; Hypertension in his father; Prostate cancer in his father and paternal uncle; Stroke in his father; Thyroid disease in his father  , reports that he has been smoking  He has been smoking about 0 20 packs per day  He has never used smokeless tobacco  He reports that he drinks alcohol  He reports that he does not use drugs  ,  has No Known Allergies     Current Outpatient Medications   Medication Sig Dispense Refill    clotrimazole-betamethasone (LOTRISONE) 1-0 05 % cream Apply topically 2 (two) times a day 30 g 0    doxycycline monohydrate (MONODOX) 100 mg capsule Take 1 capsule (100 mg total) by mouth 2 (two) times a day for 10 days 20 capsule 0    mupirocin (BACTROBAN) 2 % ointment Apply topically 3 (three) times a day 22 g 0    Saw Palmetto, Serenoa repens, 450 MG CAPS Take by mouth      tamsulosin (FLOMAX) 0 4 mg Take 2 capsules (0 8 mg total) by mouth daily with dinner 180 capsule 1    triamcinolone (KENALOG) 0 1 % cream Apply topically 2 (two) times a day 30 g 0     No current facility-administered medications for this visit  Review of Systems   Constitutional: Negative for activity change, appetite change, chills and fever  HENT: Negative for congestion, mouth sores, sore throat and trouble swallowing  Respiratory: Negative for shortness of breath  Gastrointestinal: Negative for abdominal pain and nausea  Genitourinary: Positive for difficulty urinating  Musculoskeletal: Negative for arthralgias and myalgias  Skin: Positive for wound  Neurological: Negative for dizziness, light-headedness and headaches  Objective:  Vitals:    01/22/20 0807   BP: 120/72   Pulse: 88   Temp: (!) 97 1 °F (36 2 °C)   SpO2: 94%   Weight: 89 8 kg (198 lb)   Height: 5' 8" (1 727 m)     Body mass index is 30 11 kg/m²  Physical Exam   Constitutional: He is oriented to person, place, and time  He appears well-developed and well-nourished  HENT:   Head: Normocephalic  Eyes: Conjunctivae are normal    Cardiovascular: Normal rate and regular rhythm  Pulmonary/Chest: Effort normal    Musculoskeletal: He exhibits no edema  Neurological: He is alert and oriented to person, place, and time  Skin: Skin is warm and dry  Lesion noted  There is erythema (no drainage)  Psychiatric: He has a normal mood and affect  His behavior is normal    Nursing note and vitals reviewed

## 2020-06-21 DIAGNOSIS — R39.11 URINARY HESITANCY: ICD-10-CM

## 2020-06-22 RX ORDER — TAMSULOSIN HYDROCHLORIDE 0.4 MG/1
0.8 CAPSULE ORAL
Qty: 180 CAPSULE | Refills: 1 | Status: SHIPPED | OUTPATIENT
Start: 2020-06-22 | End: 2020-09-10 | Stop reason: SDUPTHER

## 2020-09-10 ENCOUNTER — OFFICE VISIT (OUTPATIENT)
Dept: FAMILY MEDICINE CLINIC | Facility: CLINIC | Age: 65
End: 2020-09-10
Payer: COMMERCIAL

## 2020-09-10 VITALS
TEMPERATURE: 96.3 F | BODY MASS INDEX: 29.86 KG/M2 | HEART RATE: 91 BPM | DIASTOLIC BLOOD PRESSURE: 70 MMHG | HEIGHT: 68 IN | WEIGHT: 197 LBS | OXYGEN SATURATION: 96 % | SYSTOLIC BLOOD PRESSURE: 118 MMHG

## 2020-09-10 DIAGNOSIS — N40.1 BPH WITH OBSTRUCTION/LOWER URINARY TRACT SYMPTOMS: Primary | ICD-10-CM

## 2020-09-10 DIAGNOSIS — Z12.5 PROSTATE CANCER SCREENING: ICD-10-CM

## 2020-09-10 DIAGNOSIS — N13.8 BPH WITH OBSTRUCTION/LOWER URINARY TRACT SYMPTOMS: Primary | ICD-10-CM

## 2020-09-10 DIAGNOSIS — E78.5 DYSLIPIDEMIA: ICD-10-CM

## 2020-09-10 DIAGNOSIS — Z00.00 ENCOUNTER FOR PREVENTIVE HEALTH EXAMINATION: ICD-10-CM

## 2020-09-10 PROBLEM — R39.11 URINARY HESITANCY: Status: RESOLVED | Noted: 2018-06-19 | Resolved: 2020-09-10

## 2020-09-10 PROCEDURE — 3725F SCREEN DEPRESSION PERFORMED: CPT | Performed by: FAMILY MEDICINE

## 2020-09-10 PROCEDURE — 1101F PT FALLS ASSESS-DOCD LE1/YR: CPT | Performed by: FAMILY MEDICINE

## 2020-09-10 PROCEDURE — 1036F TOBACCO NON-USER: CPT | Performed by: FAMILY MEDICINE

## 2020-09-10 PROCEDURE — 99397 PER PM REEVAL EST PAT 65+ YR: CPT | Performed by: FAMILY MEDICINE

## 2020-09-10 PROCEDURE — 3288F FALL RISK ASSESSMENT DOCD: CPT | Performed by: FAMILY MEDICINE

## 2020-09-10 RX ORDER — TAMSULOSIN HYDROCHLORIDE 0.4 MG/1
0.8 CAPSULE ORAL
Qty: 180 CAPSULE | Refills: 3 | Status: SHIPPED | OUTPATIENT
Start: 2020-09-10 | End: 2021-11-08

## 2020-09-10 RX ORDER — FINASTERIDE 5 MG/1
5 TABLET, FILM COATED ORAL DAILY
Qty: 90 TABLET | Refills: 3 | Status: SHIPPED | OUTPATIENT
Start: 2020-09-10 | End: 2021-10-21 | Stop reason: SDUPTHER

## 2020-09-10 RX ORDER — TAMSULOSIN HYDROCHLORIDE 0.4 MG/1
0.8 CAPSULE ORAL
Qty: 180 CAPSULE | Refills: 1 | Status: CANCELLED | OUTPATIENT
Start: 2020-09-10

## 2020-09-10 NOTE — PROGRESS NOTES
Assessment/Plan:    Return visit in 1 year time for annual physical   We will call regarding results of his blood tests     Problem List Items Addressed This Visit        Genitourinary    BPH with obstruction/lower urinary tract symptoms - Primary    Relevant Medications    tamsulosin (FLOMAX) 0 4 mg    finasteride (PROSCAR) 5 mg tablet       Other    Encounter for preventive health examination    Dyslipidemia    Relevant Orders    CBC and differential    Comprehensive metabolic panel    Lipid panel      Other Visit Diagnoses     Prostate cancer screening        Relevant Orders    PSA, Total Screen            Subjective:      Patient ID: Nathan Dhaliwal is a 72 y o  male  Patient comes in for checkup  He quit smoking 7 months ago  He complains of urinary hesitation despite taking maximum dose of Flomax  The following portions of the patient's history were reviewed and updated as appropriate:   He has no past medical history on file  ,  does not have any pertinent problems on file  ,   has a past surgical history that includes Finger amputation; pr colonoscopy flx dx w/collj spec when pfrmd (N/A, 5/15/2017); Spine surgery; and Varicose vein surgery (02/2019)  ,  family history includes Asthma in his daughter; Cancer in his father; Heart disease in his father; Hypertension in his father; Prostate cancer in his father and paternal uncle; Stroke in his father; Thyroid disease in his father  ,   reports that he quit smoking about 7 months ago  He smoked 0 20 packs per day  He has never used smokeless tobacco  He reports current alcohol use  He reports that he does not use drugs  ,  has No Known Allergies     Current Outpatient Medications   Medication Sig Dispense Refill    clotrimazole-betamethasone (LOTRISONE) 1-0 05 % cream Apply topically 2 (two) times a day 30 g 0    tamsulosin (FLOMAX) 0 4 mg Take 2 capsules (0 8 mg total) by mouth daily with dinner 180 capsule 3    finasteride (PROSCAR) 5 mg tablet Take 1 tablet (5 mg total) by mouth daily 90 tablet 3    Saw Palmetto, Serenoa repens, 450 MG CAPS Take by mouth       No current facility-administered medications for this visit  Review of Systems   Constitutional: Negative  Respiratory: Negative  Cardiovascular: Negative  Gastrointestinal: Negative  Genitourinary: Positive for difficulty urinating  Objective:  Vitals:    09/10/20 0847   BP: 118/70   BP Location: Left arm   Patient Position: Sitting   Pulse: 91   Temp: (!) 96 3 °F (35 7 °C)   TempSrc: Tympanic   SpO2: 96%   Weight: 89 4 kg (197 lb)   Height: 5' 8" (1 727 m)     Body mass index is 29 95 kg/m²  Physical Exam  Constitutional:       Appearance: Normal appearance  He is well-developed  HENT:      Head: Normocephalic and atraumatic  Right Ear: Tympanic membrane, ear canal and external ear normal       Left Ear: Tympanic membrane, ear canal and external ear normal       Mouth/Throat:      Mouth: Mucous membranes are moist       Pharynx: Oropharynx is clear  Eyes:      Pupils: Pupils are equal, round, and reactive to light  Neck:      Musculoskeletal: Neck supple  Cardiovascular:      Rate and Rhythm: Normal rate and regular rhythm  Heart sounds: Normal heart sounds  Pulmonary:      Effort: Pulmonary effort is normal       Breath sounds: Normal breath sounds  Abdominal:      General: Bowel sounds are normal       Palpations: Abdomen is soft  Musculoskeletal: Normal range of motion  Skin:     General: Skin is warm and dry  Neurological:      Mental Status: He is alert and oriented to person, place, and time  Psychiatric:         Thought Content:  Thought content normal

## 2020-09-20 LAB
ALBUMIN SERPL-MCNC: 4.3 G/DL (ref 3.6–5.1)
ALBUMIN/GLOB SERPL: 1.9 (CALC) (ref 1–2.5)
ALP SERPL-CCNC: 91 U/L (ref 35–144)
ALT SERPL-CCNC: 28 U/L (ref 9–46)
AST SERPL-CCNC: 23 U/L (ref 10–35)
BASOPHILS # BLD AUTO: 27 CELLS/UL (ref 0–200)
BASOPHILS NFR BLD AUTO: 0.3 %
BILIRUB SERPL-MCNC: 0.7 MG/DL (ref 0.2–1.2)
BUN SERPL-MCNC: 13 MG/DL (ref 7–25)
BUN/CREAT SERPL: ABNORMAL (CALC) (ref 6–22)
CALCIUM SERPL-MCNC: 9.6 MG/DL (ref 8.6–10.3)
CHLORIDE SERPL-SCNC: 105 MMOL/L (ref 98–110)
CHOLEST SERPL-MCNC: 187 MG/DL
CHOLEST/HDLC SERPL: 5.8 (CALC)
CO2 SERPL-SCNC: 28 MMOL/L (ref 20–32)
CREAT SERPL-MCNC: 1.01 MG/DL (ref 0.7–1.25)
EOSINOPHIL # BLD AUTO: 98 CELLS/UL (ref 15–500)
EOSINOPHIL NFR BLD AUTO: 1.1 %
ERYTHROCYTE [DISTWIDTH] IN BLOOD BY AUTOMATED COUNT: 13.4 % (ref 11–15)
GLOBULIN SER CALC-MCNC: 2.3 G/DL (CALC) (ref 1.9–3.7)
GLUCOSE SERPL-MCNC: 102 MG/DL (ref 65–99)
HCT VFR BLD AUTO: 47.3 % (ref 38.5–50)
HDLC SERPL-MCNC: 32 MG/DL
HGB BLD-MCNC: 16.1 G/DL (ref 13.2–17.1)
LDLC SERPL CALC-MCNC: 133 MG/DL (CALC)
LYMPHOCYTES # BLD AUTO: 2216 CELLS/UL (ref 850–3900)
LYMPHOCYTES NFR BLD AUTO: 24.9 %
MCH RBC QN AUTO: 30.8 PG (ref 27–33)
MCHC RBC AUTO-ENTMCNC: 34 G/DL (ref 32–36)
MCV RBC AUTO: 90.4 FL (ref 80–100)
MONOCYTES # BLD AUTO: 1237 CELLS/UL (ref 200–950)
MONOCYTES NFR BLD AUTO: 13.9 %
NEUTROPHILS # BLD AUTO: 5322 CELLS/UL (ref 1500–7800)
NEUTROPHILS NFR BLD AUTO: 59.8 %
NONHDLC SERPL-MCNC: 155 MG/DL (CALC)
PLATELET # BLD AUTO: 159 THOUSAND/UL (ref 140–400)
PMV BLD REES-ECKER: 11 FL (ref 7.5–12.5)
POTASSIUM SERPL-SCNC: 4.4 MMOL/L (ref 3.5–5.3)
PROT SERPL-MCNC: 6.6 G/DL (ref 6.1–8.1)
PSA SERPL-MCNC: 0.7 NG/ML
RBC # BLD AUTO: 5.23 MILLION/UL (ref 4.2–5.8)
SL AMB EGFR AFRICAN AMERICAN: 90 ML/MIN/1.73M2
SL AMB EGFR NON AFRICAN AMERICAN: 78 ML/MIN/1.73M2
SODIUM SERPL-SCNC: 140 MMOL/L (ref 135–146)
TRIGL SERPL-MCNC: 113 MG/DL
WBC # BLD AUTO: 8.9 THOUSAND/UL (ref 3.8–10.8)

## 2021-10-19 ENCOUNTER — TELEPHONE (OUTPATIENT)
Dept: FAMILY MEDICINE CLINIC | Facility: CLINIC | Age: 66
End: 2021-10-19

## 2021-10-19 DIAGNOSIS — Z12.5 PROSTATE CANCER SCREENING: ICD-10-CM

## 2021-10-19 DIAGNOSIS — E78.5 DYSLIPIDEMIA: Primary | ICD-10-CM

## 2021-10-19 PROBLEM — L98.9 SKIN LESIONS: Status: RESOLVED | Noted: 2019-11-11 | Resolved: 2021-10-19

## 2021-10-21 DIAGNOSIS — N13.8 BPH WITH OBSTRUCTION/LOWER URINARY TRACT SYMPTOMS: ICD-10-CM

## 2021-10-21 DIAGNOSIS — N40.1 BPH WITH OBSTRUCTION/LOWER URINARY TRACT SYMPTOMS: ICD-10-CM

## 2021-10-21 RX ORDER — FINASTERIDE 5 MG/1
5 TABLET, FILM COATED ORAL DAILY
Qty: 30 TABLET | Refills: 0 | Status: SHIPPED | OUTPATIENT
Start: 2021-10-21 | End: 2021-11-08 | Stop reason: SDUPTHER

## 2021-11-04 LAB
ALBUMIN SERPL-MCNC: 4.2 G/DL (ref 3.6–5.1)
ALBUMIN/GLOB SERPL: 1.8 (CALC) (ref 1–2.5)
ALP SERPL-CCNC: 95 U/L (ref 35–144)
ALT SERPL-CCNC: 21 U/L (ref 9–46)
AST SERPL-CCNC: 20 U/L (ref 10–35)
BASOPHILS # BLD AUTO: 49 CELLS/UL (ref 0–200)
BASOPHILS NFR BLD AUTO: 0.5 %
BILIRUB SERPL-MCNC: 0.4 MG/DL (ref 0.2–1.2)
BUN SERPL-MCNC: 14 MG/DL (ref 7–25)
BUN/CREAT SERPL: NORMAL (CALC) (ref 6–22)
CALCIUM SERPL-MCNC: 9.3 MG/DL (ref 8.6–10.3)
CHLORIDE SERPL-SCNC: 104 MMOL/L (ref 98–110)
CHOLEST SERPL-MCNC: 171 MG/DL
CHOLEST/HDLC SERPL: 5.2 (CALC)
CO2 SERPL-SCNC: 28 MMOL/L (ref 20–32)
CREAT SERPL-MCNC: 0.99 MG/DL (ref 0.7–1.25)
EOSINOPHIL # BLD AUTO: 107 CELLS/UL (ref 15–500)
EOSINOPHIL NFR BLD AUTO: 1.1 %
ERYTHROCYTE [DISTWIDTH] IN BLOOD BY AUTOMATED COUNT: 12.5 % (ref 11–15)
GLOBULIN SER CALC-MCNC: 2.3 G/DL (CALC) (ref 1.9–3.7)
GLUCOSE SERPL-MCNC: 97 MG/DL (ref 65–99)
HCT VFR BLD AUTO: 45.7 % (ref 38.5–50)
HDLC SERPL-MCNC: 33 MG/DL
HGB BLD-MCNC: 15.7 G/DL (ref 13.2–17.1)
LDLC SERPL CALC-MCNC: 116 MG/DL (CALC)
LYMPHOCYTES # BLD AUTO: 2367 CELLS/UL (ref 850–3900)
LYMPHOCYTES NFR BLD AUTO: 24.4 %
MCH RBC QN AUTO: 30.9 PG (ref 27–33)
MCHC RBC AUTO-ENTMCNC: 34.4 G/DL (ref 32–36)
MCV RBC AUTO: 90 FL (ref 80–100)
MONOCYTES # BLD AUTO: 1426 CELLS/UL (ref 200–950)
MONOCYTES NFR BLD AUTO: 14.7 %
NEUTROPHILS # BLD AUTO: 5752 CELLS/UL (ref 1500–7800)
NEUTROPHILS NFR BLD AUTO: 59.3 %
NONHDLC SERPL-MCNC: 138 MG/DL (CALC)
PLATELET # BLD AUTO: 147 THOUSAND/UL (ref 140–400)
PMV BLD REES-ECKER: 11.2 FL (ref 7.5–12.5)
POTASSIUM SERPL-SCNC: 4.5 MMOL/L (ref 3.5–5.3)
PROT SERPL-MCNC: 6.5 G/DL (ref 6.1–8.1)
PSA SERPL-MCNC: 0.41 NG/ML
RBC # BLD AUTO: 5.08 MILLION/UL (ref 4.2–5.8)
SL AMB EGFR AFRICAN AMERICAN: 92 ML/MIN/1.73M2
SL AMB EGFR NON AFRICAN AMERICAN: 79 ML/MIN/1.73M2
SODIUM SERPL-SCNC: 139 MMOL/L (ref 135–146)
TRIGL SERPL-MCNC: 113 MG/DL
WBC # BLD AUTO: 9.7 THOUSAND/UL (ref 3.8–10.8)

## 2021-11-08 ENCOUNTER — OFFICE VISIT (OUTPATIENT)
Dept: FAMILY MEDICINE CLINIC | Facility: CLINIC | Age: 66
End: 2021-11-08
Payer: COMMERCIAL

## 2021-11-08 VITALS
HEIGHT: 68 IN | DIASTOLIC BLOOD PRESSURE: 84 MMHG | SYSTOLIC BLOOD PRESSURE: 124 MMHG | HEART RATE: 95 BPM | OXYGEN SATURATION: 95 % | BODY MASS INDEX: 31.43 KG/M2 | WEIGHT: 207.4 LBS

## 2021-11-08 DIAGNOSIS — N13.8 BPH WITH OBSTRUCTION/LOWER URINARY TRACT SYMPTOMS: ICD-10-CM

## 2021-11-08 DIAGNOSIS — N52.9 ERECTILE DYSFUNCTION, UNSPECIFIED ERECTILE DYSFUNCTION TYPE: ICD-10-CM

## 2021-11-08 DIAGNOSIS — N40.1 BPH WITH OBSTRUCTION/LOWER URINARY TRACT SYMPTOMS: ICD-10-CM

## 2021-11-08 DIAGNOSIS — Z00.00 HEALTH MAINTENANCE EXAMINATION: Primary | ICD-10-CM

## 2021-11-08 DIAGNOSIS — Z12.5 PROSTATE CANCER SCREENING: ICD-10-CM

## 2021-11-08 DIAGNOSIS — L40.9 PSORIASIS: ICD-10-CM

## 2021-11-08 DIAGNOSIS — G47.00 INSOMNIA, UNSPECIFIED TYPE: ICD-10-CM

## 2021-11-08 DIAGNOSIS — E78.5 DYSLIPIDEMIA: ICD-10-CM

## 2021-11-08 PROCEDURE — 4040F PNEUMOC VAC/ADMIN/RCVD: CPT | Performed by: FAMILY MEDICINE

## 2021-11-08 PROCEDURE — 1036F TOBACCO NON-USER: CPT | Performed by: FAMILY MEDICINE

## 2021-11-08 PROCEDURE — 1101F PT FALLS ASSESS-DOCD LE1/YR: CPT | Performed by: FAMILY MEDICINE

## 2021-11-08 PROCEDURE — 90732 PPSV23 VACC 2 YRS+ SUBQ/IM: CPT | Performed by: FAMILY MEDICINE

## 2021-11-08 PROCEDURE — 99397 PER PM REEVAL EST PAT 65+ YR: CPT | Performed by: FAMILY MEDICINE

## 2021-11-08 PROCEDURE — 90471 IMMUNIZATION ADMIN: CPT | Performed by: FAMILY MEDICINE

## 2021-11-08 PROCEDURE — 3725F SCREEN DEPRESSION PERFORMED: CPT | Performed by: FAMILY MEDICINE

## 2021-11-08 PROCEDURE — 3288F FALL RISK ASSESSMENT DOCD: CPT | Performed by: FAMILY MEDICINE

## 2021-11-08 PROCEDURE — 3008F BODY MASS INDEX DOCD: CPT | Performed by: FAMILY MEDICINE

## 2021-11-08 PROCEDURE — 1160F RVW MEDS BY RX/DR IN RCRD: CPT | Performed by: FAMILY MEDICINE

## 2021-11-08 RX ORDER — TAMSULOSIN HYDROCHLORIDE 0.4 MG/1
CAPSULE ORAL
Qty: 180 CAPSULE | Refills: 3 | Status: SHIPPED | OUTPATIENT
Start: 2021-11-08

## 2021-11-08 RX ORDER — SILDENAFIL 50 MG/1
50 TABLET, FILM COATED ORAL DAILY PRN
Qty: 10 TABLET | Refills: 10 | Status: SHIPPED | OUTPATIENT
Start: 2021-11-08

## 2021-11-08 RX ORDER — FINASTERIDE 5 MG/1
5 TABLET, FILM COATED ORAL DAILY
Qty: 90 TABLET | Refills: 3 | Status: SHIPPED | OUTPATIENT
Start: 2021-11-08

## 2021-11-08 RX ORDER — TRIAMCINOLONE ACETONIDE 5 MG/G
CREAM TOPICAL 3 TIMES DAILY
Qty: 30 G | Refills: 3 | Status: SHIPPED | OUTPATIENT
Start: 2021-11-08